# Patient Record
Sex: FEMALE | Race: WHITE | NOT HISPANIC OR LATINO | Employment: OTHER | URBAN - METROPOLITAN AREA
[De-identification: names, ages, dates, MRNs, and addresses within clinical notes are randomized per-mention and may not be internally consistent; named-entity substitution may affect disease eponyms.]

---

## 2021-11-03 ENCOUNTER — OFFICE VISIT (OUTPATIENT)
Dept: NEUROSURGERY | Facility: CLINIC | Age: 71
End: 2021-11-03

## 2021-11-03 VITALS
HEART RATE: 80 BPM | BODY MASS INDEX: 31.28 KG/M2 | DIASTOLIC BLOOD PRESSURE: 51 MMHG | WEIGHT: 170 LBS | SYSTOLIC BLOOD PRESSURE: 123 MMHG | HEIGHT: 62 IN

## 2021-11-03 DIAGNOSIS — M54.50 CHRONIC MIDLINE LOW BACK PAIN WITHOUT SCIATICA: ICD-10-CM

## 2021-11-03 DIAGNOSIS — M51.26 HERNIATED NUCLEUS PULPOSUS, L2-3 RIGHT: Primary | ICD-10-CM

## 2021-11-03 DIAGNOSIS — G89.29 CHRONIC MIDLINE LOW BACK PAIN WITHOUT SCIATICA: ICD-10-CM

## 2021-11-03 PROCEDURE — 99204 OFFICE O/P NEW MOD 45 MIN: CPT | Performed by: PHYSICIAN ASSISTANT

## 2021-11-03 RX ORDER — TOLTERODINE 4 MG/1
CAPSULE, EXTENDED RELEASE ORAL
COMMUNITY
Start: 2021-08-12

## 2021-11-03 RX ORDER — FAMOTIDINE 20 MG/1
20 TABLET, FILM COATED ORAL 2 TIMES DAILY
COMMUNITY

## 2021-11-03 RX ORDER — DM/PE/ACETAMINOPHEN/CHLORPHENR 10-5-325-2
TABLET, SEQUENTIAL ORAL
COMMUNITY
Start: 2021-08-09

## 2021-11-03 RX ORDER — LEVOTHYROXINE SODIUM 112 MCG
TABLET ORAL
COMMUNITY
Start: 2021-09-07

## 2021-11-03 RX ORDER — PRAVASTATIN SODIUM 40 MG
TABLET ORAL
COMMUNITY
Start: 2021-09-27

## 2021-11-03 RX ORDER — ASPIRIN 81 MG/1
81 TABLET, CHEWABLE ORAL DAILY
COMMUNITY

## 2021-11-03 RX ORDER — FENOFIBRATE 160 MG/1
160 TABLET ORAL DAILY
COMMUNITY

## 2021-11-03 RX ORDER — FLUTICASONE PROPIONATE 50 MCG
SPRAY, SUSPENSION (ML) NASAL
COMMUNITY
Start: 2021-10-17

## 2021-11-03 NOTE — PROGRESS NOTES
"Chief Complaint  No chief complaint on file.    Subjective          Ale Gastelum who is a 70 y.o. year old female who presents to [facility] for Evaluation of the Spine.     The patient complains of pain located in the Lumbar Spine.  Patients states the pain has been present for 1 month.  The pain came on gradually.  The pain scaled level is 4.  The pain does radiate. Dermatomes are located on right Lumbar at: below the knee and a non-specific dermatome..  The pain is Intermittent and described as throbbing.  The pain is worse at no particular time of day. Patient states Pain Medication, NSAIDs and physical therapy makes the pain better.  Patient states nothing worsens the pain.    Associated Symptoms Include: Denies numbness, tingling, weakness, or loss of bowel or bladder control.  Conservative Interventions Include: Physical Therapy that was effective., NSAIDs that were somewhat effective. and Tylenol, which was somewhat effective.    Was this the result of an injury or accident?: No    History of Previous Spinal Surgery?: No     reports that she has quit smoking. Her smoking use included cigarettes. She has never used smokeless tobacco.    Review of Systems   Musculoskeletal: Positive for back pain and myalgias.        Objective   Vital Signs:   /51   Pulse 80   Ht 157.5 cm (62\")   Wt 77.1 kg (170 lb)   BMI 31.09 kg/m²       Physical Exam  Constitutional:       Appearance: Normal appearance. She is obese.   Pulmonary:      Effort: Pulmonary effort is normal.   Musculoskeletal:         General: No tenderness.      Comments: SLR negative bilaterally   Neurological:      General: No focal deficit present.      Mental Status: She is alert and oriented to person, place, and time.      Sensory: No sensory deficit.      Motor: No weakness.      Deep Tendon Reflexes: Reflexes normal.   Psychiatric:         Mood and Affect: Mood normal.         Behavior: Behavior " normal.        Neurologic Exam     Mental Status   Oriented to person, place, and time.        Result Review     I have personally reviewed the MRI of the lumbar spine without contrast from 10/12/2021 which shows multilevel degenerative disc disease and facet arthropathy, worse at L2-L3 where there is a right paracentral disc herniation causing moderate canal stenosis there is also mild spinal stenosis and foraminal narrowing at the L3-L4, L4-L5 and L5-S1 levels.       Assessment and Plan    Diagnoses and all orders for this visit:    1. Herniated nucleus pulposus, L2-3 right (Primary)    2. Chronic midline low back pain without sciatica    She does have extrusion on the right at L2-L3 with pain going just past the medial knee on the right, this may fit the L3 dermatome, but she does have significant multi-level disc disease and facet arthritis, so it could also be just referred pain from her spine. She would like to avoid surgery if possible.    She should continue therapy at this time as it has been helpful so far.    She may also consider LESB at L2-L3 to assess benefit for her pain. She would like to defer this for now.    The patient was counseled on basic recommendations for the reduction and prevention of back, neck, or spine pain in association with spinal disorders, including: cessation/avoidance of nicotine use, maintenance of a healthy BMI and weight, focusing on building/maintaining core strength through core exercise, and avoidance of activities which worsen the pain. The patient will monitor for changes in symptoms and notify our clinic of these changes as needed.    She will follow-up here PRN for failure to improve or worsening symptoms.      Follow Up   Return if symptoms worsen or fail to improve.  Patient was given instructions and counseling regarding her condition or for health maintenance advice. Please see specific information pulled into the AVS if appropriate.